# Patient Record
Sex: FEMALE | Race: OTHER | HISPANIC OR LATINO | Employment: UNEMPLOYED | URBAN - METROPOLITAN AREA
[De-identification: names, ages, dates, MRNs, and addresses within clinical notes are randomized per-mention and may not be internally consistent; named-entity substitution may affect disease eponyms.]

---

## 2022-07-26 ENCOUNTER — HOSPITAL ENCOUNTER (EMERGENCY)
Facility: HOSPITAL | Age: 52
Discharge: HOME | End: 2022-07-26
Attending: EMERGENCY MEDICINE
Payer: COMMERCIAL

## 2022-07-26 VITALS
HEART RATE: 80 BPM | TEMPERATURE: 99 F | WEIGHT: 143.75 LBS | OXYGEN SATURATION: 99 % | SYSTOLIC BLOOD PRESSURE: 142 MMHG | BODY MASS INDEX: 25.47 KG/M2 | HEIGHT: 63 IN | RESPIRATION RATE: 18 BRPM | DIASTOLIC BLOOD PRESSURE: 82 MMHG

## 2022-07-26 DIAGNOSIS — M79.18 MUSCULOSKELETAL PAIN: ICD-10-CM
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
## 2022-07-26 PROCEDURE — 63600175 PHARM REV CODE 636 W HCPCS: Performed by: NURSE PRACTITIONER

## 2022-07-26 PROCEDURE — 25000003 PHARM REV CODE 250: Performed by: NURSE PRACTITIONER

## 2022-07-26 PROCEDURE — 99284 EMERGENCY DEPT VISIT MOD MDM: CPT | Mod: 25

## 2022-07-26 PROCEDURE — 96372 THER/PROPH/DIAG INJ SC/IM: CPT | Performed by: NURSE PRACTITIONER

## 2022-07-26 RX ORDER — CYCLOBENZAPRINE HCL 10 MG
5 TABLET ORAL 3 TIMES DAILY PRN
Qty: 8 TABLET | Refills: 0 | Status: SHIPPED | OUTPATIENT
Start: 2022-07-26 | End: 2022-07-31

## 2022-07-26 RX ORDER — HYDROCODONE BITARTRATE AND ACETAMINOPHEN 5; 325 MG/1; MG/1
1 TABLET ORAL
Status: COMPLETED | OUTPATIENT
Start: 2022-07-26 | End: 2022-07-26

## 2022-07-26 RX ORDER — ORPHENADRINE CITRATE 30 MG/ML
60 INJECTION INTRAMUSCULAR; INTRAVENOUS
Status: COMPLETED | OUTPATIENT
Start: 2022-07-26 | End: 2022-07-26

## 2022-07-26 RX ORDER — HYDROCODONE BITARTRATE AND ACETAMINOPHEN 5; 325 MG/1; MG/1
1 TABLET ORAL EVERY 6 HOURS PRN
Qty: 12 TABLET | Refills: 0 | OUTPATIENT
Start: 2022-07-26 | End: 2022-12-05

## 2022-07-26 RX ADMIN — ORPHENADRINE CITRATE 60 MG: 30 INJECTION INTRAMUSCULAR; INTRAVENOUS at 01:07

## 2022-07-26 RX ADMIN — HYDROCODONE BITARTRATE AND ACETAMINOPHEN 1 TABLET: 5; 325 TABLET ORAL at 01:07
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
## 2022-07-26 NOTE — Clinical Note
"Danika Graves "Danika Graves" Gary Jackman was seen and treated in our emergency department on 7/26/2022.  She may return to work on 07/28/2022.       If you have any questions or concerns, please don't hesitate to call.      ASIF Calvin"

## 2022-12-05 ENCOUNTER — HOSPITAL ENCOUNTER (EMERGENCY)
Facility: HOSPITAL | Age: 52
Discharge: HOME | End: 2022-12-05
Attending: EMERGENCY MEDICINE

## 2022-12-05 VITALS
BODY MASS INDEX: 25.61 KG/M2 | SYSTOLIC BLOOD PRESSURE: 105 MMHG | HEART RATE: 70 BPM | TEMPERATURE: 98 F | RESPIRATION RATE: 16 BRPM | DIASTOLIC BLOOD PRESSURE: 76 MMHG | WEIGHT: 150 LBS | HEIGHT: 64 IN | OXYGEN SATURATION: 94 %

## 2022-12-05 DIAGNOSIS — J11.1 INFLUENZA: Primary | ICD-10-CM

## 2022-12-05 DIAGNOSIS — R07.9 CHEST PAIN: ICD-10-CM

## 2022-12-05 DIAGNOSIS — R05.9 COUGH: ICD-10-CM

## 2022-12-05 LAB
ALBUMIN SERPL-MCNC: 4.3 GM/DL (ref 3.5–5)
ALBUMIN/GLOB SERPL: 1.3 RATIO (ref 1.1–2)
ALP SERPL-CCNC: 101 UNIT/L (ref 40–150)
ALT SERPL-CCNC: 307 UNIT/L (ref 0–55)
APPEARANCE UR: ABNORMAL
AST SERPL-CCNC: 202 UNIT/L (ref 5–34)
B-HCG SERPL QL: NEGATIVE
BASOPHILS # BLD AUTO: 0.01 X10(3)/MCL (ref 0–0.2)
BASOPHILS NFR BLD AUTO: 0.3 %
BILIRUB UR QL STRIP.AUTO: NEGATIVE MG/DL
BILIRUBIN DIRECT+TOT PNL SERPL-MCNC: 1.1 MG/DL
BNP BLD-MCNC: 16.7 PG/ML
BUN SERPL-MCNC: 12 MG/DL (ref 7–18.7)
CALCIUM SERPL-MCNC: 9.2 MG/DL (ref 8.4–10.2)
CHLORIDE SERPL-SCNC: 107 MMOL/L (ref 98–107)
CO2 SERPL-SCNC: 21 MMOL/L (ref 22–29)
COLOR UR AUTO: YELLOW
CREAT SERPL-MCNC: 0.74 MG/DL (ref 0.55–1.02)
EOSINOPHIL # BLD AUTO: 0 X10(3)/MCL (ref 0–0.9)
EOSINOPHIL NFR BLD AUTO: 0 %
ERYTHROCYTE [DISTWIDTH] IN BLOOD BY AUTOMATED COUNT: 12.5 % (ref 11.5–17)
FLUAV AG UPPER RESP QL IA.RAPID: DETECTED
FLUBV AG UPPER RESP QL IA.RAPID: NOT DETECTED
GFR SERPLBLD CREATININE-BSD FMLA CKD-EPI: >60 MLS/MIN/1.73/M2
GLOBULIN SER-MCNC: 3.4 GM/DL (ref 2.4–3.5)
GLUCOSE SERPL-MCNC: 189 MG/DL (ref 74–100)
GLUCOSE UR QL STRIP.AUTO: NEGATIVE MG/DL
HCT VFR BLD AUTO: 42.5 % (ref 37–47)
HGB BLD-MCNC: 14 GM/DL (ref 12–16)
IMM GRANULOCYTES # BLD AUTO: 0.01 X10(3)/MCL (ref 0–0.04)
IMM GRANULOCYTES NFR BLD AUTO: 0.3 %
KETONES UR QL STRIP.AUTO: NEGATIVE MG/DL
LEUKOCYTE ESTERASE UR QL STRIP.AUTO: NEGATIVE UNIT/L
LYMPHOCYTES # BLD AUTO: 1.12 X10(3)/MCL (ref 0.6–4.6)
LYMPHOCYTES NFR BLD AUTO: 31.3 %
MCH RBC QN AUTO: 29.2 PG (ref 27–31)
MCHC RBC AUTO-ENTMCNC: 32.9 MG/DL (ref 33–36)
MCV RBC AUTO: 88.5 FL (ref 80–94)
MONOCYTES # BLD AUTO: 0.43 X10(3)/MCL (ref 0.1–1.3)
MONOCYTES NFR BLD AUTO: 12 %
NEUTROPHILS # BLD AUTO: 2 X10(3)/MCL (ref 2.1–9.2)
NEUTROPHILS NFR BLD AUTO: 56.1 %
NITRITE UR QL STRIP.AUTO: NEGATIVE
NRBC BLD AUTO-RTO: 0 %
PH UR STRIP.AUTO: 6 [PH]
PLATELET # BLD AUTO: 215 X10(3)/MCL (ref 130–400)
PMV BLD AUTO: 11.2 FL (ref 7.4–10.4)
POTASSIUM SERPL-SCNC: 3.8 MMOL/L (ref 3.5–5.1)
PROT SERPL-MCNC: 7.7 GM/DL (ref 6.4–8.3)
PROT UR QL STRIP.AUTO: NEGATIVE MG/DL
RBC # BLD AUTO: 4.8 X10(6)/MCL (ref 4.2–5.4)
RBC UR QL AUTO: NEGATIVE UNIT/L
RSV A 5' UTR RNA NPH QL NAA+PROBE: NOT DETECTED
SARS-COV-2 RNA RESP QL NAA+PROBE: NOT DETECTED
SODIUM SERPL-SCNC: 138 MMOL/L (ref 136–145)
SP GR UR STRIP.AUTO: 1.01
STREP A PCR (OHS): NOT DETECTED
TROPONIN I SERPL-MCNC: <0.01 NG/ML (ref 0–0.04)
UROBILINOGEN UR STRIP-ACNC: 0.2 MG/DL
WBC # SPEC AUTO: 3.6 X10(3)/MCL (ref 4.5–11.5)

## 2022-12-05 PROCEDURE — 87651 STREP A DNA AMP PROBE: CPT | Performed by: EMERGENCY MEDICINE

## 2022-12-05 PROCEDURE — 0241U COVID/RSV/FLU A&B PCR: CPT | Performed by: EMERGENCY MEDICINE

## 2022-12-05 PROCEDURE — 83880 ASSAY OF NATRIURETIC PEPTIDE: CPT | Performed by: EMERGENCY MEDICINE

## 2022-12-05 PROCEDURE — 93010 EKG 12-LEAD: ICD-10-PCS | Mod: ,,, | Performed by: INTERNAL MEDICINE

## 2022-12-05 PROCEDURE — 81025 URINE PREGNANCY TEST: CPT | Performed by: EMERGENCY MEDICINE

## 2022-12-05 PROCEDURE — 84484 ASSAY OF TROPONIN QUANT: CPT | Performed by: EMERGENCY MEDICINE

## 2022-12-05 PROCEDURE — 81003 URINALYSIS AUTO W/O SCOPE: CPT | Performed by: EMERGENCY MEDICINE

## 2022-12-05 PROCEDURE — 85025 COMPLETE CBC W/AUTO DIFF WBC: CPT | Performed by: EMERGENCY MEDICINE

## 2022-12-05 PROCEDURE — 63600175 PHARM REV CODE 636 W HCPCS: Performed by: EMERGENCY MEDICINE

## 2022-12-05 PROCEDURE — 93010 ELECTROCARDIOGRAM REPORT: CPT | Mod: ,,, | Performed by: INTERNAL MEDICINE

## 2022-12-05 PROCEDURE — 80053 COMPREHEN METABOLIC PANEL: CPT | Performed by: EMERGENCY MEDICINE

## 2022-12-05 PROCEDURE — 99285 EMERGENCY DEPT VISIT HI MDM: CPT | Mod: 25

## 2022-12-05 PROCEDURE — 93005 ELECTROCARDIOGRAM TRACING: CPT

## 2022-12-05 PROCEDURE — 96374 THER/PROPH/DIAG INJ IV PUSH: CPT

## 2022-12-05 RX ORDER — IBUPROFEN 600 MG/1
600 TABLET ORAL EVERY 6 HOURS PRN
Qty: 20 TABLET | Refills: 0 | Status: SHIPPED | OUTPATIENT
Start: 2022-12-05

## 2022-12-05 RX ORDER — KETOROLAC TROMETHAMINE 30 MG/ML
30 INJECTION, SOLUTION INTRAMUSCULAR; INTRAVENOUS
Status: COMPLETED | OUTPATIENT
Start: 2022-12-05 | End: 2022-12-05

## 2022-12-05 RX ORDER — METHOCARBAMOL 750 MG/1
750 TABLET, FILM COATED ORAL 3 TIMES DAILY
Qty: 30 TABLET | Refills: 0 | Status: SHIPPED | OUTPATIENT
Start: 2022-12-05 | End: 2022-12-15

## 2022-12-05 RX ORDER — OSELTAMIVIR PHOSPHATE 75 MG/1
75 CAPSULE ORAL 2 TIMES DAILY
Qty: 10 CAPSULE | Refills: 0 | Status: SHIPPED | OUTPATIENT
Start: 2022-12-05 | End: 2022-12-10

## 2022-12-05 RX ADMIN — KETOROLAC TROMETHAMINE 30 MG: 30 INJECTION, SOLUTION INTRAMUSCULAR at 10:12

## 2022-12-05 NOTE — ED PROVIDER NOTES
Encounter Date: 12/5/2022       History     Chief Complaint   Patient presents with    Pleurisy     Non-prod. Cough, lt sided chest pain radiating to arm     Patient is a 50 yo F presenting with cough, non productive. Ongoing for the past couple of days. She has some sick family relatives. She's had chills but no fevers. Today she woke up with left sided chest wall 'tiredness' with some radiation to the left shoulder. When I tried to clarify if this was painful, she just repeated that it felt tired and would rate it a 10/10. No falls or accidents. No leg swelling. No vomiting. No pleuritic component to the chest pain.       Review of patient's allergies indicates:  No Known Allergies  Past Medical History:   Diagnosis Date    Diabetes mellitus      No past surgical history on file.  No family history on file.  Social History     Tobacco Use    Smoking status: Never    Smokeless tobacco: Never   Substance Use Topics    Alcohol use: Never    Drug use: Never     Review of Systems   Constitutional:  Positive for chills.   HENT:  Positive for congestion, postnasal drip and rhinorrhea. Negative for sinus pressure.    Respiratory:  Positive for cough.    Cardiovascular:  Positive for chest pain. Negative for leg swelling.   Gastrointestinal:  Negative for abdominal pain and vomiting.   Genitourinary:  Negative for dysuria.   Skin:  Negative for rash.     Physical Exam     Initial Vitals [12/05/22 1008]   BP Pulse Resp Temp SpO2   120/79 78 18 98.1 °F (36.7 °C) 96 %      MAP       --         Physical Exam    Nursing note and vitals reviewed.  Constitutional: She appears well-developed and well-nourished. No distress.   HENT:   Head: Normocephalic and atraumatic.   Eyes: Conjunctivae are normal. Pupils are equal, round, and reactive to light.   Neck: Neck supple.   Cardiovascular:  Normal rate, regular rhythm and normal heart sounds.           No murmur heard.  Pulmonary/Chest: Breath sounds normal. No respiratory distress.  She has no wheezes. She has no rhonchi.   Abdominal: Abdomen is soft. Bowel sounds are normal. She exhibits no distension. There is no abdominal tenderness. There is no rebound and no guarding.   Musculoskeletal:         General: No edema. Normal range of motion.      Cervical back: Neck supple.     Neurological: She is alert and oriented to person, place, and time.   Skin: Skin is warm and dry.   Psychiatric: She has a normal mood and affect. Thought content normal.       ED Course   Procedures  Labs Reviewed   COMPREHENSIVE METABOLIC PANEL - Abnormal; Notable for the following components:       Result Value    Carbon Dioxide 21 (*)     Glucose Level 189 (*)     Alanine Aminotransferase 307 (*)     Aspartate Aminotransferase 202 (*)     All other components within normal limits   COVID/RSV/FLU A&B PCR - Abnormal; Notable for the following components:    Influenza A PCR Detected (*)     All other components within normal limits    Narrative:     The Xpert Xpress SARS-CoV-2/FLU/RSV plus is a rapid, multiplexed real-time PCR test intended for the simultaneous qualitative detection and differentiation of SARS-CoV-2, Influenza A, Influenza B, and respiratory syncytial virus (RSV) viral RNA in either nasopharyngeal swab or nasal swab specimens.         CBC WITH DIFFERENTIAL - Abnormal; Notable for the following components:    WBC 3.6 (*)     MCHC 32.9 (*)     MPV 11.2 (*)     Neut # 2.0 (*)     All other components within normal limits   URINALYSIS, REFLEX TO URINE CULTURE - Abnormal; Notable for the following components:    Appearance, UA SL CLOUDY (*)     All other components within normal limits   TROPONIN I - Normal   B-TYPE NATRIURETIC PEPTIDE - Normal   STREP GROUP A BY PCR - Normal    Narrative:     The Xpert Xpress Strep A test is a rapid, qualitative in vitro diagnostic test for the detection of Streptococcus pyogenes (Group A ß-hemolytic Streptococcus, Strep A) in throat swab specimens from patients with signs  and symptoms of pharyngitis.     PREGNANCY TEST, URINE RAPID - Normal   CBC W/ AUTO DIFFERENTIAL    Narrative:     The following orders were created for panel order CBC auto differential.  Procedure                               Abnormality         Status                     ---------                               -----------         ------                     CBC with Differential[[id number]]        Abnormal            Final result                 Please view results for these tests on the individual orders.     EKG Readings: (Independently Interpreted)   EKG Interpretation 10:12 AM    Rate: 83  Rhythm: NSR  QRS: WNL  Qtc: WNL  No STEMI, No depressions or elevations.     ECG Results              EKG 12-lead (Final result)  Result time 12/08/22 00:25:58      Final result by Interface, Lab [facility] (12/08/22 00:25:58)                   Narrative:    Test Reason : R07.9,    Vent. Rate : 083 BPM     Atrial Rate : 083 BPM     P-R Int : 134 ms          QRS Dur : 086 ms      QT Int : 368 ms       P-R-T Axes : 026 091 042 degrees     QTc Int : 432 ms    Normal sinus rhythm  Rightward axis  Borderline Abnormal ECG    Confirmed by Dominic Sharif MD ([id number]) on 12/8/2022 12:25:50 AM    Referred By: [email or web address]   SELF           Confirmed By:Dominic Sharif MD                                  Imaging Results              US Abdomen Limited_Gallbladder (Final result)  Result time 12/05/22 12:52:41      Final result by Wesley Dolan MD (12/05/22 12:52:41)                   Impression:      Cholelithiasis with no biliary dilatation or sonographic findings for cholecystitis.      Electronically signed by: Wesley Dolan  Date:    12/05/2022  Time:    12:52               Narrative:    EXAMINATION:  US ABDOMEN LIMITED_GALLBLADDER    CLINICAL HISTORY:  elevated LFTS;    TECHNIQUE:  Gray-scale and color Doppler ultrasound images of the abdomen.    COMPARISON:  No relevant comparison studies available at the time of  dictation.    FINDINGS:  Pancreas partially obscured with no abnormality identified as visualized.  9 mm gallstone.  No gallbladder wall thickening or ascites.  Negative sonographic Whiet sign.  Normal CBD caliber.  Main portal vein patent with normal flow direction.  Right kidney not visualized on these images.                                       X-Ray Chest AP Portable (Final result)  Result time 12/05/22 10:45:10      Final result by Lukas Tilley MD (12/05/22 10:45:10)                   Impression:      No acute findings.      Electronically signed by: Lukas Tilley  Date:    12/05/2022  Time:    10:45               Narrative:    EXAMINATION:  XR CHEST AP PORTABLE    CLINICAL HISTORY:  Chest Pain;    COMPARISON:  8 May 2019    FINDINGS:  Portable frontal view of the chest was obtained. The heart is not enlarged.  Lungs are clear.  There is no pneumothorax or significant effusion.                                       Medications   ketorolac injection 30 mg (30 mg Intravenous Given 12/5/22 1056)     Medical Decision Making:   Results discussed with patient including elevated LFTs and need for follow up. Language line was used to matt this information. All questions were answered.                         Clinical Impression:   Final diagnoses:  [R07.9] Chest pain  [R05.9] Cough  [J11.1] Influenza (Primary)      ED Disposition Condition    Discharge Stable          ED Prescriptions       Medication Sig Dispense Start Date End Date Auth. Provider    methocarbamoL (ROBAXIN) 750 MG Tab Take 1 tablet (750 mg total) by mouth 3 (three) times daily. for 10 days 30 tablet 12/5/2022 12/15/2022 Debi Morales MD    ibuprofen (ADVIL,MOTRIN) 600 MG tablet Take 1 tablet (600 mg total) by mouth every 6 (six) hours as needed for Pain. 20 tablet 12/5/2022 -- Debi Morales MD    oseltamivir (TAMIFLU) 75 MG capsule Take 1 capsule (75 mg total) by mouth 2 (two) times daily. for 5 days 10 capsule 12/5/2022 12/10/2022 Debi KRUGER  MD Andrew          Follow-up Information       Follow up With Specialties Details Why Contact Info    Sage un seguimiento con steward médico esta semana                 Debi Morales MD  12/08/22 2274

## 2022-12-05 NOTE — ED TRIAGE NOTES
C/o non-prod. Cough, lt sided chest wall pain radiating to lt arm for past few hrs. States chills and multiple ill family members

## 2022-12-05 NOTE — DISCHARGE INSTRUCTIONS
Sage un seguimiento con steward médico esta semana para volver a controlar myles pruebas de función hepática. Regrese al Departamento de Emergencias si los síntomas empeoran ann-marie comentamos. No tome tylenol.

## 2023-01-15 ENCOUNTER — HOSPITAL ENCOUNTER (EMERGENCY)
Facility: HOSPITAL | Age: 53
Discharge: HOME | End: 2023-01-15
Attending: INTERNAL MEDICINE

## 2023-01-15 VITALS
HEART RATE: 89 BPM | DIASTOLIC BLOOD PRESSURE: 1 MMHG | OXYGEN SATURATION: 98 % | RESPIRATION RATE: 18 BRPM | TEMPERATURE: 99 F | HEIGHT: 60 IN | WEIGHT: 150 LBS | BODY MASS INDEX: 29.45 KG/M2 | SYSTOLIC BLOOD PRESSURE: 113 MMHG

## 2023-01-15 DIAGNOSIS — K80.50 BILIARY COLIC SYMPTOM: Primary | ICD-10-CM

## 2023-01-15 DIAGNOSIS — R11.2 NAUSEA AND VOMITING, UNSPECIFIED VOMITING TYPE: ICD-10-CM

## 2023-01-15 DIAGNOSIS — Z91.89 HAS DIFFICULTY ACCESSING PRIMARY CARE PROVIDER FOR MOST VISITS: ICD-10-CM

## 2023-01-15 LAB
ALBUMIN SERPL-MCNC: 4.4 G/DL (ref 3.5–5)
ALBUMIN/GLOB SERPL: 1.4 RATIO (ref 1.1–2)
ALP SERPL-CCNC: 72 UNIT/L (ref 40–150)
ALT SERPL-CCNC: 81 UNIT/L (ref 0–55)
AST SERPL-CCNC: 37 UNIT/L (ref 5–34)
BASOPHILS # BLD AUTO: 0.01 X10(3)/MCL (ref 0–0.2)
BASOPHILS NFR BLD AUTO: 0.1 %
BILIRUBIN DIRECT+TOT PNL SERPL-MCNC: 1.8 MG/DL
BUN SERPL-MCNC: 18.1 MG/DL (ref 9.8–20.1)
CALCIUM SERPL-MCNC: 10 MG/DL (ref 8.4–10.2)
CHLORIDE SERPL-SCNC: 107 MMOL/L (ref 98–107)
CO2 SERPL-SCNC: 22 MMOL/L (ref 22–29)
CREAT SERPL-MCNC: 0.74 MG/DL (ref 0.55–1.02)
EOSINOPHIL # BLD AUTO: 0.07 X10(3)/MCL (ref 0–0.9)
EOSINOPHIL NFR BLD AUTO: 1 %
ERYTHROCYTE [DISTWIDTH] IN BLOOD BY AUTOMATED COUNT: 12.6 % (ref 11.5–17)
GFR SERPLBLD CREATININE-BSD FMLA CKD-EPI: >60 MLS/MIN/1.73/M2
GLOBULIN SER-MCNC: 3.2 GM/DL (ref 2.4–3.5)
GLUCOSE SERPL-MCNC: 182 MG/DL (ref 74–100)
HCT VFR BLD AUTO: 42.3 % (ref 37–47)
HGB BLD-MCNC: 14.1 GM/DL (ref 12–16)
IMM GRANULOCYTES # BLD AUTO: 0.02 X10(3)/MCL (ref 0–0.04)
IMM GRANULOCYTES NFR BLD AUTO: 0.3 %
INR BLD: 1.04 (ref 2–3)
LIPASE SERPL-CCNC: 25 U/L
LYMPHOCYTES # BLD AUTO: 0.91 X10(3)/MCL (ref 0.6–4.6)
LYMPHOCYTES NFR BLD AUTO: 12.6 %
MAGNESIUM SERPL-MCNC: 1.9 MG/DL (ref 1.6–2.6)
MCH RBC QN AUTO: 29.8 PG
MCHC RBC AUTO-ENTMCNC: 33.3 MG/DL (ref 33–36)
MCV RBC AUTO: 89.4 FL (ref 80–94)
MONOCYTES # BLD AUTO: 0.23 X10(3)/MCL (ref 0.1–1.3)
MONOCYTES NFR BLD AUTO: 3.2 %
NEUTROPHILS # BLD AUTO: 6 X10(3)/MCL (ref 2.1–9.2)
NEUTROPHILS NFR BLD AUTO: 82.8 %
NRBC BLD AUTO-RTO: 0 %
PLATELET # BLD AUTO: 228 X10(3)/MCL (ref 130–400)
PMV BLD AUTO: 11.4 FL (ref 7.4–10.4)
POTASSIUM SERPL-SCNC: 3.9 MMOL/L (ref 3.5–5.1)
PROT SERPL-MCNC: 7.6 GM/DL (ref 6.4–8.3)
PROTHROMBIN TIME: 13.4 SECONDS (ref 11.7–14.5)
RBC # BLD AUTO: 4.73 X10(6)/MCL (ref 4.2–5.4)
SODIUM SERPL-SCNC: 140 MMOL/L (ref 136–145)
WBC # SPEC AUTO: 7.2 X10(3)/MCL (ref 4.5–11.5)

## 2023-01-15 PROCEDURE — 83735 ASSAY OF MAGNESIUM: CPT | Performed by: INTERNAL MEDICINE

## 2023-01-15 PROCEDURE — 85610 PROTHROMBIN TIME: CPT | Performed by: INTERNAL MEDICINE

## 2023-01-15 PROCEDURE — 96361 HYDRATE IV INFUSION ADD-ON: CPT

## 2023-01-15 PROCEDURE — 83690 ASSAY OF LIPASE: CPT | Performed by: INTERNAL MEDICINE

## 2023-01-15 PROCEDURE — 80053 COMPREHEN METABOLIC PANEL: CPT | Performed by: INTERNAL MEDICINE

## 2023-01-15 PROCEDURE — 85025 COMPLETE CBC W/AUTO DIFF WBC: CPT | Performed by: INTERNAL MEDICINE

## 2023-01-15 PROCEDURE — 99285 EMERGENCY DEPT VISIT HI MDM: CPT | Mod: 25

## 2023-01-15 PROCEDURE — 25000003 PHARM REV CODE 250: Performed by: INTERNAL MEDICINE

## 2023-01-15 PROCEDURE — 63600175 PHARM REV CODE 636 W HCPCS: Performed by: INTERNAL MEDICINE

## 2023-01-15 PROCEDURE — 96374 THER/PROPH/DIAG INJ IV PUSH: CPT

## 2023-01-15 RX ORDER — ONDANSETRON 4 MG/1
4 TABLET, ORALLY DISINTEGRATING ORAL EVERY 6 HOURS PRN
Qty: 15 TABLET | Refills: 0 | Status: SHIPPED | OUTPATIENT
Start: 2023-01-15

## 2023-01-15 RX ORDER — ONDANSETRON 2 MG/ML
4 INJECTION INTRAMUSCULAR; INTRAVENOUS ONCE
Status: COMPLETED | OUTPATIENT
Start: 2023-01-15 | End: 2023-01-15

## 2023-01-15 RX ORDER — SUCRALFATE 1 G/1
1 TABLET ORAL
Qty: 30 TABLET | Refills: 0 | Status: SHIPPED | OUTPATIENT
Start: 2023-01-15

## 2023-01-15 RX ORDER — PROMETHAZINE HYDROCHLORIDE 25 MG/1
25 TABLET ORAL EVERY 6 HOURS PRN
Qty: 15 TABLET | Refills: 0 | Status: SHIPPED | OUTPATIENT
Start: 2023-01-15

## 2023-01-15 RX ORDER — PANTOPRAZOLE SODIUM 40 MG/1
40 TABLET, DELAYED RELEASE ORAL 2 TIMES DAILY
Qty: 20 TABLET | Refills: 0 | Status: SHIPPED | OUTPATIENT
Start: 2023-01-15 | End: 2023-01-25

## 2023-01-15 RX ADMIN — SODIUM CHLORIDE 1000 ML: 9 INJECTION, SOLUTION INTRAVENOUS at 12:01

## 2023-01-15 RX ADMIN — ONDANSETRON 4 MG: 2 INJECTION INTRAMUSCULAR; INTRAVENOUS at 12:01

## 2023-01-15 NOTE — ED PROVIDER NOTES
Source of History:  Patient, mild limitations Amharic speaking only and  used    Chief complaint:  Emesis (Pt complaint of vomiting this am with general weakness and numbness to lower lip)      HPI:  Danika Jackman is a 52 y.o. female presenting with Emesis (Pt complaint of vomiting this am with general weakness and numbness to lower lip)         Presents with nausea and vomiting of undigested food.  Onset of symptoms was abrupt starting a few hours ago with stable course since that time. Symptoms have been occuring  all day.  Outpatient therapy with none has been attempted and symptoms have failed to improve. Symptoms are currently rated mild. Associated signs & symptoms include abdominal pain.        Review of Systems   Constitutional symptoms:  Negative except as documented in HPI.   Skin symptoms:  Negative except as documented in HPI.   HEENT symptoms:  Negative except as documented in HPI.   Respiratory symptoms:  Negative except as documented in HPI.   Cardiovascular symptoms:  Negative except as documented in HPI.   Gastrointestinal symptoms:  Negative except as documented in HPI.    Genitourinary symptoms:  Negative except as documented in HPI.   Musculoskeletal symptoms:  Negative except as documented in HPI.   Neurologic symptoms:  Negative except as documented in HPI.   Psychiatric symptoms:  Negative except as documented in HPI.   Allergy/immunologic symptoms:  Negative except as documented in HPI.             Additional review of systems information: All other systems reviewed and otherwise negative.      Review of patient's allergies indicates:  No Known Allergies    PMH:  As per HPI and below:    Past Medical History:   Diagnosis Date    Diabetes mellitus         No family history on file.    No past surgical history on file.    Social History     Tobacco Use    Smoking status: Never    Smokeless tobacco: Never   Substance Use Topics    Alcohol use: Never    Drug use:  Never       There is no problem list on file for this patient.       Physical Exam:    BP (!) 113/1 (BP Location: Left arm, Patient Position: Sitting)   Pulse 89   Temp 98.6 °F (37 °C) (Oral)   Resp 18   Ht 5' (1.524 m)   Wt 68 kg (150 lb)   LMP 11/10/2022   SpO2 98%   Breastfeeding No   BMI 29.29 kg/m²     Nursing note and vital signs reviewed.    General:  Alert, no acute distress.   Skin: Normal for Ethnic Origin, No cyanosis  HEENT: Normocephalic and atraumatic, Vision unchanged, Pupils symmetric, No icterus , Nasal mucosa is pink and moist  Cardiovascular:  Regular rate and rhythm, No edema  Chest Wall: No deformity, equal chest rise  Respiratory:  Lungs are clear to auscultation, respirations are non-labored.    Musculoskeletal:  No deformity, Normal perfusion to all extremities  Gastrointestinal:  Soft, Non distended, RUQ and epigastric tenderness without rebound  Neurological:  Alert and oriented, normal motor observed, normal speech observed.    Psychiatric:  Cooperative, appropriate mood & affect.        Labs that have been ordered have been independently reviewed and interpreted by myself.     Old Chart Reviewed.      Initial Impression/ Differential Dx:  GERD, intestinal spasm, gastroenteritis, gastritis, ulcer, cholecystitis, cholelithiasis, gallstones, pancreatitis, ileus, small bowel obstruction, appendicitis, diverticulitis, colitis, constipation, intestinal gas pain.      MDM:      Reviewed Nurses Note.    Reviewed Pertinent old records.    Orders Placed This Encounter    CT Abdomen Pelvis  Without Contrast    CBC Auto Differential    Comprehensive Metabolic Panel    Protime-INR    Lipase    Magnesium    CBC with Differential    Ambulatory referral/consult to Family Practice    sodium chloride 0.9% bolus 1,000 mL 1,000 mL    ondansetron injection 4 mg    ondansetron (ZOFRAN-ODT) 4 MG TbDL    promethazine (PHENERGAN) 25 MG tablet    sucralfate (CARAFATE) 1 gram tablet    pantoprazole  (PROTONIX) 40 MG tablet                    Labs Reviewed   COMPREHENSIVE METABOLIC PANEL - Abnormal; Notable for the following components:       Result Value    Glucose Level 182 (*)     Bilirubin Total 1.8 (*)     Alanine Aminotransferase 81 (*)     Aspartate Aminotransferase 37 (*)     All other components within normal limits   PROTIME-INR - Abnormal; Notable for the following components:    INR 1.04 (*)     All other components within normal limits   CBC WITH DIFFERENTIAL - Abnormal; Notable for the following components:    MPV 11.4 (*)     All other components within normal limits   LIPASE - Normal   MAGNESIUM - Normal   CBC W/ AUTO DIFFERENTIAL    Narrative:     The following orders were created for panel order CBC Auto Differential.  Procedure                               Abnormality         Status                     ---------                               -----------         ------                     CBC with Differential[[id number]]        Abnormal            Final result                 Please view results for these tests on the individual orders.          CT Abdomen Pelvis  Without Contrast   Final Result      Enlarged irregular appearing uterus possibly representing uterine fibroids.  Pelvic ultrasound correlation is recommended      Bilateral ovarian cysts      Markedly atrophic right kidney most likely congenital in nature      Cholelithiasis but no evidence of cholecystitis      Hepatomegaly         Electronically signed by: Steve Portillo   Date:    01/15/2023   Time:    13:10           Admission on 01/15/2023, Discharged on 01/15/2023   Component Date Value Ref Range Status    Sodium Level 01/15/2023 140  136 - 145 mmol/L Final    Potassium Level 01/15/2023 3.9  3.5 - 5.1 mmol/L Final    Chloride 01/15/2023 107  98 - 107 mmol/L Final    Carbon Dioxide 01/15/2023 22  22 - 29 mmol/L Final    Glucose Level 01/15/2023 182 (H)  74 - 100 mg/dL Final    Blood Urea Nitrogen 01/15/2023 18.1  9.8 - 20.1  mg/dL Final    Creatinine 01/15/2023 0.74  0.55 - 1.02 mg/dL Final    Calcium Level Total 01/15/2023 10.0  8.4 - 10.2 mg/dL Final    Protein Total 01/15/2023 7.6  6.4 - 8.3 gm/dL Final    Albumin Level 01/15/2023 4.4  3.5 - 5.0 g/dL Final    Globulin 01/15/2023 3.2  2.4 - 3.5 gm/dL Final    Albumin/Globulin Ratio 01/15/2023 1.4  1.1 - 2.0 ratio Final    Bilirubin Total 01/15/2023 1.8 (H)  <=1.5 mg/dL Final    Alkaline Phosphatase 01/15/2023 72  40 - 150 unit/L Final    Alanine Aminotransferase 01/15/2023 81 (H)  0 - 55 unit/L Final    Aspartate Aminotransferase 01/15/2023 37 (H)  5 - 34 unit/L Final    eGFR 01/15/2023 >60  mls/min/1.73/m2 Final    PT 01/15/2023 13.4  11.7 - 14.5 seconds Final    INR 01/15/2023 1.04 (L)  2.00 - 3.00 Final    Lipase Level 01/15/2023 25  <=60 U/L Final    Magnesium Level 01/15/2023 1.90  1.60 - 2.60 mg/dL Final    WBC 01/15/2023 7.2  4.5 - 11.5 x10(3)/mcL Final    RBC 01/15/2023 4.73  4.20 - 5.40 x10(6)/mcL Final    Hgb 01/15/2023 14.1  12.0 - 16.0 gm/dL Final    Hct 01/15/2023 42.3  37.0 - 47.0 % Final    MCV 01/15/2023 89.4  80.0 - 94.0 fL Final    MCH 01/15/2023 29.8  pg Final    MCHC 01/15/2023 33.3  33.0 - 36.0 mg/dL Final    RDW 01/15/2023 12.6  11.5 - 17.0 % Final    Platelet 01/15/2023 228  130 - 400 x10(3)/mcL Final    MPV 01/15/2023 11.4 (H)  7.4 - 10.4 fL Final    Neut % 01/15/2023 82.8  % Final    Lymph % 01/15/2023 12.6  % Final    Mono % 01/15/2023 3.2  % Final    Eos % 01/15/2023 1.0  % Final    Basophil % 01/15/2023 0.1  % Final    Lymph # 01/15/2023 0.91  0.6 - 4.6 x10(3)/mcL Final    Neut # 01/15/2023 6.00  2.1 - 9.2 x10(3)/mcL Final    Mono # 01/15/2023 0.23  0.1 - 1.3 x10(3)/mcL Final    Eos # 01/15/2023 0.07  0 - 0.9 x10(3)/mcL Final    Baso # 01/15/2023 0.01  0 - 0.2 x10(3)/mcL Final    IG# 01/15/2023 0.02  0 - 0.04 x10(3)/mcL Final    IG% 01/15/2023 0.3  % Final    NRBC% 01/15/2023 0.0  % Final       Imaging Results              CT Abdomen Pelvis  Without  Contrast (Final result)  Result time 01/15/23 13:10:26      Final result by Steve Portillo MD (01/15/23 13:10:26)                   Impression:      Enlarged irregular appearing uterus possibly representing uterine fibroids.  Pelvic ultrasound correlation is recommended    Bilateral ovarian cysts    Markedly atrophic right kidney most likely congenital in nature    Cholelithiasis but no evidence of cholecystitis    Hepatomegaly      Electronically signed by: Steve Portillo  Date:    01/15/2023  Time:    13:10               Narrative:    EXAMINATION:  CT ABDOMEN PELVIS WITHOUT CONTRAST    CLINICAL HISTORY:  Abdominal pain, acute, nonlocalized;    TECHNIQUE:  Low dose axial images, sagittal and coronal reformations were obtained from the lung bases to the pubic symphysis.  No contrast was administered.  Automatic exposure control is utilized to reduce patient radiation exposure.    COMPARISON:  None    FINDINGS:  There is mild bibasilar atelectasis..    The liver is enlarged in size..  No obvious liver mass or lesion is seen.    There is a punctate gallstone in the gallbladder.  No pericholecystic fluid seen.  No gallbladder wall thickening or inflammatory changes are seen..    The pancreas appears normal.  No inflammatory changes are seen in the pancreatic region.    The spleen appears normal and adrenal glands appear normal.  No adrenal nodule is seen.    The right kidney is atrophic.  No nephrolithiasis is seen.  No hydronephrosis is seen.  No hydroureter is seen.  No ureteral stone is seen.    No colitis is seen.  No diverticulitis is seen.  The appendix is not seen.    The uterus is enlarged in size and there appear to be some masses in the uterine fundus.  Findings most likely represent uterine fibroids.  There is some ovarian cysts seen bilaterally.    No free air seen.  No free fluid is seen.  The urinary bladder appears normal.    Bones show no acute abnormality.                                                                            Diagnostic Impression:    1. Biliary colic symptom    2. Nausea and vomiting, unspecified vomiting type    3. Has difficulty accessing primary care provider for most visits         ED Disposition Condition    Discharge Stable             Follow-up Information       [facility] - Emergency Dept.    Specialty: Emergency Medicine  Why: If symptoms worsen  Contact information:  Lucia Gonzalez  [facility] [id number] [phone]                            ED Prescriptions       Medication Sig Dispense Start Date End Date Auth. Provider    ondansetron (ZOFRAN-ODT) 4 MG TbDL Take 1 tablet (4 mg total) by mouth every 6 (six) hours as needed (nausea). 15 tablet 1/15/2023 -- Mike Brar DO    promethazine (PHENERGAN) 25 MG tablet Take 1 tablet (25 mg total) by mouth every 6 (six) hours as needed for Nausea. 15 tablet 1/15/2023 -- Mike Brar, DO    sucralfate (CARAFATE) 1 gram tablet Take 1 tablet (1 g total) by mouth before meals and at bedtime as needed (nausea). 30 tablet 1/15/2023 -- Mike Brar, DO    pantoprazole (PROTONIX) 40 MG tablet Take 1 tablet (40 mg total) by mouth 2 (two) times daily. for 10 days 20 tablet 1/15/2023 1/25/2023 Mike Brar DO          Follow-up Information       Follow up With Specialties Details Why Contact Info    [facility] - Emergency Dept Emergency Medicine  If symptoms worsen [id number]  [facility] [id number] [phone]             Mike Brar DO  01/15/23 1631

## 2025-09-04 DIAGNOSIS — N95.0 POSTMENOPAUSAL BLEEDING: Primary | ICD-10-CM
